# Patient Record
Sex: MALE | Race: WHITE | NOT HISPANIC OR LATINO | Employment: FULL TIME | URBAN - METROPOLITAN AREA
[De-identification: names, ages, dates, MRNs, and addresses within clinical notes are randomized per-mention and may not be internally consistent; named-entity substitution may affect disease eponyms.]

---

## 2018-01-03 ENCOUNTER — NON-PROVIDER VISIT (OUTPATIENT)
Dept: URGENT CARE | Facility: PHYSICIAN GROUP | Age: 38
End: 2018-01-03

## 2018-01-03 PROCEDURE — 8907 PR URINE COLLECT ONLY: Performed by: NURSE PRACTITIONER

## 2019-02-12 ENCOUNTER — OFFICE VISIT (OUTPATIENT)
Dept: URGENT CARE | Facility: CLINIC | Age: 39
End: 2019-02-12
Payer: MEDICAID

## 2019-02-12 VITALS
TEMPERATURE: 98.6 F | OXYGEN SATURATION: 98 % | RESPIRATION RATE: 16 BRPM | BODY MASS INDEX: 20.53 KG/M2 | SYSTOLIC BLOOD PRESSURE: 116 MMHG | DIASTOLIC BLOOD PRESSURE: 64 MMHG | WEIGHT: 160 LBS | HEART RATE: 65 BPM | HEIGHT: 74 IN

## 2019-02-12 DIAGNOSIS — M54.50 ACUTE RIGHT-SIDED LOW BACK PAIN WITHOUT SCIATICA: ICD-10-CM

## 2019-02-12 PROCEDURE — 99203 OFFICE O/P NEW LOW 30 MIN: CPT | Performed by: PHYSICIAN ASSISTANT

## 2019-02-12 RX ORDER — KETOROLAC TROMETHAMINE 30 MG/ML
60 INJECTION, SOLUTION INTRAMUSCULAR; INTRAVENOUS ONCE
Status: COMPLETED | OUTPATIENT
Start: 2019-02-12 | End: 2019-02-12

## 2019-02-12 RX ORDER — CYCLOBENZAPRINE HCL 10 MG
10 TABLET ORAL 3 TIMES DAILY PRN
Qty: 30 TAB | Refills: 0 | Status: SHIPPED | OUTPATIENT
Start: 2019-02-12

## 2019-02-12 RX ORDER — METHYLPREDNISOLONE 4 MG/1
TABLET ORAL
Qty: 1 KIT | Refills: 0 | Status: SHIPPED | OUTPATIENT
Start: 2019-02-12 | End: 2020-07-13

## 2019-02-12 RX ADMIN — KETOROLAC TROMETHAMINE 60 MG: 30 INJECTION, SOLUTION INTRAMUSCULAR; INTRAVENOUS at 13:25

## 2019-02-12 ASSESSMENT — ENCOUNTER SYMPTOMS
SHORTNESS OF BREATH: 0
WEIGHT LOSS: 0
HEADACHES: 0
NUMBNESS: 0
NAUSEA: 0
FEVER: 0
ABDOMINAL PAIN: 0
SENSORY CHANGE: 0
BLURRED VISION: 0
BOWEL INCONTINENCE: 0
PARESTHESIAS: 0
PALPITATIONS: 0
TINGLING: 0
BACK PAIN: 1
CHILLS: 0
VOMITING: 0

## 2019-02-12 NOTE — LETTER
February 12, 2019         Patient: Everton Junior   YOB: 1980   Date of Visit: 2/12/2019           To Whom it May Concern:    Everton Junior was seen in my clinic on 2/12/2019. He may return to work on 2/13/2019.    If you have any questions or concerns, please don't hesitate to call.        Sincerely,           Bibi Hurley P.A.-C.  Electronically Signed

## 2019-02-13 NOTE — PROGRESS NOTES
Subjective:      Everton Junior is a 38 y.o. male who presents with Back Pain (x3 months, comes and goes, throbbing, mostly on the right side)      Back Pain   This is a new problem. The current episode started today. The problem occurs constantly. The problem is unchanged. The pain is present in the lumbar spine. The quality of the pain is described as aching and cramping. The pain does not radiate. The pain is moderate. The pain is the same all the time. The symptoms are aggravated by bending, lying down and twisting. Pertinent negatives include no abdominal pain, bladder incontinence, bowel incontinence, chest pain, fever, headaches, numbness, paresthesias, pelvic pain, tingling or weight loss. He has tried NSAIDs for the symptoms. The treatment provided no relief.   Patient states that he first had an episode of back pain on his right side proximally 3 months ago.  He cannot recall any injury or change in activity that precipitated it.  He says that ever since that time it has been flaring up intermittently.  It has been fine for the past month but this morning when he woke up he said it was acutely painful again.  He says that the pain is there constantly but it has been consistently in his right lumbar spine and does not radiate into his lower extremities.    Review of Systems   Constitutional: Negative for chills, fever and weight loss.   Eyes: Negative for blurred vision.   Respiratory: Negative for shortness of breath.    Cardiovascular: Negative for chest pain and palpitations.   Gastrointestinal: Negative for abdominal pain, bowel incontinence, nausea and vomiting.   Genitourinary: Negative for bladder incontinence and pelvic pain.   Musculoskeletal: Positive for back pain.   Neurological: Negative for tingling, sensory change, numbness, headaches and paresthesias.       PMH:  has a past medical history of ASTHMA and Dental disorder.  MEDS:   Current Outpatient Prescriptions:   •  MethylPREDNISolone  "(MEDROL DOSEPAK) 4 MG Tablet Therapy Pack, Take as directed, Disp: 1 Kit, Rfl: 0  •  cyclobenzaprine (FLEXERIL) 10 MG Tab, Take 1 Tab by mouth 3 times a day as needed., Disp: 30 Tab, Rfl: 0  ALLERGIES: No Known Allergies  SURGHX:   Past Surgical History:   Procedure Laterality Date   • TENDON REPAIR  8/21/2009    Performed by ALBERTO CUBA at SURGERY SAME DAY [facility] ORS   • NERVE REPAIR  8/21/2009    Performed by ALBERTO CUBA at SURGERY SAME DAY [facility] ORS     SOCHX:  reports that he has been smoking.  He has been smoking about 1.00 pack per day. He does not have any smokeless tobacco history on file. He reports that he drinks alcohol. He reports that he does not use drugs.  FH: Family history was reviewed, no pertinent findings to report     Objective:     /64   Pulse 65   Temp 37 °C (98.6 °F)   Resp 16   Ht 1.88 m (6' 2\")   Wt 72.6 kg (160 lb)   SpO2 98%   BMI 20.54 kg/m²      Physical Exam   Constitutional: He is oriented to person, place, and time. He appears well-developed and well-nourished.   HENT:   Head: Normocephalic and atraumatic.   Right Ear: External ear normal.   Left Ear: External ear normal.   Eyes: Pupils are equal, round, and reactive to light. Conjunctivae are normal.   Neck: Normal range of motion.   Cardiovascular: Normal rate, regular rhythm and normal heart sounds.    No murmur heard.  Pulmonary/Chest: Effort normal and breath sounds normal. He has no wheezes.   Musculoskeletal:        Lumbar back: He exhibits decreased range of motion (Due to pain), tenderness (Paraspinous muscles tender to palpation) and spasm. He exhibits no bony tenderness and no swelling.        Back:    Thoracic and lumbar spinous processes are nontender to palpation with no step-offs identified.  Right lumbar paraspinous muscles are TTP.  No erythema, ecchymosis, swelling, or obvious deformity identified on exam.     Neurological: He is alert and oriented to person, place, and time.   Skin: Skin is " warm and dry. Capillary refill takes less than 2 seconds.   Psychiatric: He has a normal mood and affect. His behavior is normal. Judgment normal.       *Patient was observed for 10-15 minutes following the Toradol injection.  No adverse reactions were observed.    Assessment/Plan:     1. Acute right-sided low back pain without sciatica  - ketorolac (TORADOL) injection 60 mg; 2 mL by Intramuscular route Once.  - MethylPREDNISolone (MEDROL DOSEPAK) 4 MG Tablet Therapy Pack; Take as directed  Dispense: 1 Kit; Refill: 0  - cyclobenzaprine (FLEXERIL) 10 MG Tab; Take 1 Tab by mouth 3 times a day as needed.  Dispense: 30 Tab; Refill: 0  -Alternate ice and heat  -Do not take any other NSAIDs with the Medrol Dosepak.  I instructed him not to start the Medrol Dosepak until tomorrow        Differential Diagnosis, natural history, and supportive care discussed. Return to the Urgent Care or follow up with your PCP if symptoms fail to resolve, or for any new or worsening symptoms. Emergency room precautions discussed. Patient and/or family appears understanding of information.

## 2020-07-13 ENCOUNTER — OFFICE VISIT (OUTPATIENT)
Dept: URGENT CARE | Facility: CLINIC | Age: 40
End: 2020-07-13
Payer: MEDICAID

## 2020-07-13 VITALS
HEART RATE: 94 BPM | HEIGHT: 74 IN | WEIGHT: 165 LBS | SYSTOLIC BLOOD PRESSURE: 126 MMHG | OXYGEN SATURATION: 93 % | RESPIRATION RATE: 16 BRPM | BODY MASS INDEX: 21.17 KG/M2 | DIASTOLIC BLOOD PRESSURE: 74 MMHG | TEMPERATURE: 98.6 F

## 2020-07-13 DIAGNOSIS — M62.830 LUMBAR PARASPINAL MUSCLE SPASM: ICD-10-CM

## 2020-07-13 PROCEDURE — 99214 OFFICE O/P EST MOD 30 MIN: CPT | Mod: 25 | Performed by: PHYSICIAN ASSISTANT

## 2020-07-13 RX ORDER — CYCLOBENZAPRINE HCL 10 MG
10 TABLET ORAL 3 TIMES DAILY PRN
Qty: 14 TAB | Refills: 0 | Status: SHIPPED | OUTPATIENT
Start: 2020-07-13

## 2020-07-13 RX ORDER — METHYLPREDNISOLONE 4 MG/1
TABLET ORAL
Qty: 21 TAB | Refills: 0 | Status: SHIPPED | OUTPATIENT
Start: 2020-07-13

## 2020-07-13 RX ORDER — KETOROLAC TROMETHAMINE 30 MG/ML
60 INJECTION, SOLUTION INTRAMUSCULAR; INTRAVENOUS ONCE
Status: COMPLETED | OUTPATIENT
Start: 2020-07-13 | End: 2020-07-13

## 2020-07-13 RX ADMIN — KETOROLAC TROMETHAMINE 60 MG: 30 INJECTION, SOLUTION INTRAMUSCULAR; INTRAVENOUS at 10:47

## 2020-07-13 ASSESSMENT — ENCOUNTER SYMPTOMS
WEAKNESS: 0
NAUSEA: 0
CHILLS: 0
SENSORY CHANGE: 0
NECK PAIN: 0
FOCAL WEAKNESS: 0
FALLS: 0
SHORTNESS OF BREATH: 0
HEADACHES: 0
BACK PAIN: 1
DIZZINESS: 0
DIARRHEA: 0
WHEEZING: 0
FEVER: 0
ABDOMINAL PAIN: 0
VOMITING: 0

## 2020-07-13 NOTE — PROGRESS NOTES
Subjective:   Everton Junior is a 39 y.o. male who presents for Low Back Pain (pt states he has this in the past and this has been going on x 3 days)      HPI  Patient is a 39-year-old male who presents with low back pain onset 3 days ago.  History of low back muscle spasms in the past.  Treated with Flexeril and Medrol Dosepak with resolution of symptoms.     Onset of symptoms when he got out of bed.  Have slept wrong.  Gradually worsened.  Worse with cough.   Low right back.   Worse with standing upright and laying down.    Limited bending and extension of back due to pain.  Worse with movements.  No injury or bending down a lot.   Moderate pain.   One 800 mg Ibuprofen last night with no relief.     Smokers cough but no worsening   Denies any fever, chills, numbness, tingling, or weakness to legs, chest pain, SOB, abdominal pain, nausea, vomiting, diarrhea, loss of bowel or bladder control.     No pertinent past medical history.       Review of Systems   Constitutional: Negative for chills, fever and malaise/fatigue.   Respiratory: Negative for shortness of breath and wheezing.    Cardiovascular: Negative for chest pain.   Gastrointestinal: Negative for abdominal pain, diarrhea, nausea and vomiting.   Musculoskeletal: Positive for back pain. Negative for falls and neck pain.   Neurological: Negative for dizziness, sensory change, focal weakness, weakness and headaches.       Medications:    • cyclobenzaprine Tabs  • methylPREDNISolone Tbpk    Allergies: Patient has no known allergies.    Problem List: Everton Junior does not have a problem list on file.    Surgical History:  Past Surgical History:   Procedure Laterality Date   • TENDON REPAIR  8/21/2009    Performed by ALBERTO CUBA at SURGERY SAME DAY [facility] ORS   • NERVE REPAIR  8/21/2009    Performed by ALBERTO CUBA at SURGERY SAME DAY [facility] ORS       Past Social Hx: Everton Junior  reports that he has been smoking. He has been smoking  "about 1.00 pack per day. He has never used smokeless tobacco. He reports previous alcohol use. He reports that he does not use drugs.     Past Family Hx:  Everton Junior family history is not on file.     Problem list, medications, and allergies reviewed by myself today in Epic.     Objective:     /74 (BP Location: Left arm, Patient Position: Sitting, BP Cuff Size: Adult)   Pulse 94   Temp 37 °C (98.6 °F) (Temporal)   Resp 16   Ht 1.88 m (6' 2\")   Wt 74.8 kg (165 lb)   SpO2 93%   BMI 21.18 kg/m²     Physical Exam  Vitals signs reviewed.   Constitutional:       General: He is not in acute distress.     Appearance: Normal appearance. He is not ill-appearing or toxic-appearing.   Eyes:      Conjunctiva/sclera: Conjunctivae normal.      Pupils: Pupils are equal, round, and reactive to light.   Neck:      Musculoskeletal: Normal range of motion and neck supple. No neck rigidity, crepitus, pain with movement or muscular tenderness.   Cardiovascular:      Rate and Rhythm: Normal rate and regular rhythm.      Heart sounds: Normal heart sounds.   Pulmonary:      Effort: Pulmonary effort is normal. No respiratory distress.      Breath sounds: Normal breath sounds. No wheezing, rhonchi or rales.   Musculoskeletal:      Comments: Back: Flexion to about 60 degrees due to pain.  Extension to about 10 degrees due to pain.  Cannot elicit any tenderness to palpation.  Spasm of right lumbar paraspinal muscles.  Negative midline tenderness, bony tenderness, crepitus, deformities, or step-offs.  No ecchymosis or swelling.  Negative straight leg raise    Lower extremities: Full range of motion.  Strength 5 out of 5 to hip flexion and extension  Strength 5 out of 5 to knee flexion extension  Sensation intact  Patellar and Achilles tendon DTR intact         Lymphadenopathy:      Cervical: No cervical adenopathy.   Skin:     General: Skin is warm and dry.   Neurological:      General: No focal deficit present.      Mental " Status: He is alert and oriented to person, place, and time.   Psychiatric:         Mood and Affect: Mood normal.         Behavior: Behavior normal.         Assessment/Plan:     Diagnosis and associated orders:     1. Lumbar paraspinal muscle spasm  ketorolac (TORADOL) injection 60 mg    methylPREDNISolone (MEDROL DOSEPAK) 4 MG Tablet Therapy Pack    cyclobenzaprine (FLEXERIL) 10 MG Tab      Comments/MDM:     • As tolerated, range of motion exercises, stretches, heat, massage.  Avoid laying down or sitting down for long periods of time except for at night when sleeping.   Treatment of cyclobenzaprine.   • Discussed with patient this medication can cause drowsiness/sedation. The patient was instructed to use medication mostly during the evening/night time. Instructed to avoid driving, operating machinery, or drinking alcohol while using this medication. Patient verbalized understanding and agreement.   • Instructed not to start Medrol Dosepak until tomorrow morning.  Instructed to avoid NSAIDs until after Medrol Dosepak.   • Discharge instructions handed to patient.  • Return to the urgent care if symptoms are not improving within the next week or not significantly improving in the next 2 weeks.   • Overall, the patient is well-appearing, suspicions for spinal stenosis or radiculopathy are low.   • He is going to follow-up with a PCP at [facility]       Red flags discussed and indications to immediately call 911 or present to the Emergency Department.   Supportive care, differential diagnoses, and indications for immediate follow-up discussed with patient.    Pathogenesis of diagnosis discussed including typical length and natural progression. Patient expresses understanding and agrees to plan.    Advised the patient to follow-up with the primary care physician for recheck, reevaluation, and consideration of further management.    Please note that this dictation was created using voice recognition software. I have  made a reasonable attempt to correct obvious errors, but I expect that there are errors of grammar and possibly content that I did not discover before finalizing the note.    This note was electronically signed by Poncho Dietrich PA-C

## 2023-04-07 ENCOUNTER — HOSPITAL ENCOUNTER (EMERGENCY)
Facility: MEDICAL CENTER | Age: 43
End: 2023-04-07
Attending: EMERGENCY MEDICINE
Payer: MEDICAID

## 2023-04-07 VITALS
HEIGHT: 74 IN | WEIGHT: 190.26 LBS | SYSTOLIC BLOOD PRESSURE: 135 MMHG | DIASTOLIC BLOOD PRESSURE: 75 MMHG | OXYGEN SATURATION: 99 % | RESPIRATION RATE: 18 BRPM | HEART RATE: 72 BPM | BODY MASS INDEX: 24.42 KG/M2 | TEMPERATURE: 98.2 F

## 2023-04-07 DIAGNOSIS — L73.9 FOLLICULITIS: ICD-10-CM

## 2023-04-07 PROCEDURE — 99284 EMERGENCY DEPT VISIT MOD MDM: CPT | Mod: 25

## 2023-04-07 PROCEDURE — 303977 HCHG I & D

## 2023-04-07 ASSESSMENT — PAIN DESCRIPTION - PAIN TYPE: TYPE: ACUTE PAIN

## 2023-04-08 NOTE — ED NOTES
Vital signs taken and recorded. Discharge in stable condition via ambulatory. Health teachings given to patient and family with full understanding of the information given. No personal belongings left.

## 2023-04-08 NOTE — ED PROVIDER NOTES
"                                                        ED Provider Note    CHIEF COMPLAINT  Chief Complaint   Patient presents with    Headache    Wound Check     Pt reports headache and pressure on his right side of the head due to a small wound (red and round), started noticing growing 4 days ago and sometimes affects his balance when walking. He had the same condition years ago. Denies fever, vomiting or eye sight problem.        HPI    Primary care provider: ANDRES Bray   History obtained from: Patient  History limited by: None     Everton Junior is a 42 y.o. male who presents to the ED complaining of enlarging painful lump on the right side of his scalp that started 3 to 4 days ago and progressively worsening.  Patient states that he was seen at [facility] yesterday for the same and was told to use warm compresses.  Patient states that he did some online research and wants it drained because \"I read that it can cause a bald spot.\"  Patient states that he had similar symptoms a few years ago when he scratched his head but the swelling went away.  Patient reports that the current swelling is causing a headache.  No fever/nausea/vomiting.    REVIEW OF SYSTEMS  Please see HPI for pertinent positives/negatives.  All other systems reviewed and are negative.     PAST MEDICAL HISTORY  Past Medical History:   Diagnosis Date    ASTHMA     Dental disorder         SURGICAL HISTORY  Past Surgical History:   Procedure Laterality Date    TENDON REPAIR  8/21/2009    Performed by ALBERTO CUBA at SURGERY SAME DAY [facility]    NERVE REPAIR  8/21/2009    Performed by ALBERTO CUBA at SURGERY SAME DAY [facility]        SOCIAL HISTORY  Social History     Tobacco Use    Smoking status: Every Day     Packs/day: 1.00     Types: Cigarettes    Smokeless tobacco: Never   Vaping Use    Vaping Use: Never used   Substance and Sexual Activity    Alcohol use: Not Currently     Comment: stopped about " "a year ago    Drug use: No    Sexual activity: Not on file        FAMILY HISTORY  No family history on file.     CURRENT MEDICATIONS  Home Medications       Reviewed by Sarita Merlos R.N. (Registered Nurse) on 04/07/23 at 2017  Med List Status: Partial     Medication Last Dose Status   cyclobenzaprine (FLEXERIL) 10 MG Tab  Active   cyclobenzaprine (FLEXERIL) 10 MG Tab  Active   cyclobenzaprine (FLEXERIL) 5 mg tablet  Active   methylPREDNISolone (MEDROL DOSEPAK) 4 MG Tablet Therapy Pack  Active   methylPREDNISolone (MEDROL DOSEPAK) 4 MG Tablet Therapy Pack  Active                     ALLERGIES  No Known Allergies     PHYSICAL EXAM  VITAL SIGNS: /75   Pulse 72   Temp 36.8 °C (98.2 °F) (Temporal)   Resp 18   Ht 1.88 m (6' 2\")   Wt 86.3 kg (190 lb 4.1 oz)   SpO2 99%   BMI 24.43 kg/m²  @MACIE[[id number]::@     Pulse ox interpretation: 96% I interpret this pulse ox as normal     Constitutional: Well developed, well nourished, alert in no apparent distress, nontoxic appearance    HENT: No external signs of trauma, approximately 1.5 cm roughly circular erythematous swelling on right parietal scalp with tenderness to palpation without crepitus/fluctuance/streaking/drainage, normocephalic  Eyes: PERRL, conjunctiva without erythema, no discharge, no icterus    Neck: No stridor, good ROM without apparent restrictions or discomfort  Thorax & Lungs: No respiratory distress  Extremities: No cyanosis, no edema, no gross deformity  Skin: Warm, dry, no pallor/cyanosis, no rash noted      Lymphatic: No lymphadenopathy noted     Neuro: A/O times 3, no focal deficits noted, ambulating without difficulty  Psychiatric: Cooperative      DIAGNOSTIC STUDIES / PROCEDURES    Verbal consent was obtained for incision and drainage.  The area of the incision site was sterilely prepped/draped and locally infiltrated with 1 mL of 2% lidocaine with epi.  An incision was then made with a #11 blade over the apex of the lesion.  " Small amount of purulent material was expressed.  The patient tolerated the procedure without complications.        LABS  All labs reviewed by me.     Results for orders placed or performed during the hospital encounter of 12/04/14   COMP METABOLIC PANEL   Result Value Ref Range    Sodium 140 135 - 145 mmol/L    Potassium 3.0 (L) 3.6 - 5.5 mmol/L    Chloride 106 96 - 112 mmol/L    Co2 24 20 - 33 mmol/L    Anion Gap 10.0 0.0 - 11.9    Glucose 78 65 - 99 mg/dL    Bun 10 8 - 22 mg/dL    Creatinine 1.08 0.50 - 1.40 mg/dL    Calcium 9.9 8.5 - 10.5 mg/dL    AST(SGOT) 23 12 - 45 U/L    ALT(SGPT) 15 2 - 50 U/L    Alkaline Phosphatase 57 30 - 99 U/L    Total Bilirubin 1.3 0.1 - 1.5 mg/dL    Albumin 4.8 3.2 - 4.9 g/dL    Total Protein 7.4 6.0 - 8.2 g/dL    Globulin 2.6 1.9 - 3.5 g/dL    A-G Ratio 1.8 g/dL   CBC WITH DIFFERENTIAL   Result Value Ref Range    WBC 12.6 (H) 4.8 - 10.8 K/uL    RBC 5.19 4.70 - 6.10 M/uL    Hemoglobin 16.5 14.0 - 18.0 g/dL    Hematocrit 46.9 42.0 - 52.0 %    MCV 90.4 81.4 - 97.8 fL    MCH 31.8 27.0 - 33.0 pg    MCHC 35.2 33.7 - 35.3 g/dL    RDW 41.0 35.9 - 50.0 fL    Platelet Count 244 164 - 446 K/uL    MPV 9.9 9.0 - 12.9 fL    Nucleated RBC 0.0 /100 WBC    NRBC (Absolute) 0.00 K/uL    Neutrophils-Polys 43.1 (L) 44.0 - 72.0 %    Lymphocytes 46.5 (H) 22.0 - 41.0 %    Monocytes 6.9 0.0 - 13.4 %    Eosinophils 1.7 0.0 - 6.9 %    Basophils 0.9 0.0 - 1.8 %    Neutrophils (Absolute) 5.54 1.82 - 7.42 K/uL    Lymphs (Absolute) 5.9 (H) 1.0 - 4.8 K/uL    Monos (Absolute) 1.00 (H) 0.00 - 0.85 K/uL    Eos (Absolute) 0.21 0.00 - 0.51 K/uL    Baso (Absolute) 0.00 0.00 - 0.12 K/uL    Anisocytosis 1+     Macrocytosis 1+    DIAGNOSTIC ALCOHOL   Result Value Ref Range    Diagnostic Alcohol 0.22 (H) 0.00 g/dL   URINE DRUG SCREEN   Result Value Ref Range    Amphetamines Urine Negative Negative    Barbiturates Negative Negative    Benzodiazepines Negative Negative    Cocaine Metabolite Negative Negative    Methadone  Negative Negative    Ecstasy Negative Negative    Opiates Negative Negative    Oxycodone Negative Negative    Phencyclidine -Pcp Negative Negative    Propoxyphene Negative Negative    Cannabinoid Metab Negative Negative   EXTRA TUBE,SÁNCHEZ   Result Value Ref Range    Extra Tube, Blue Collected    ESTIMATED GFR   Result Value Ref Range    GFR If African American >60 >60 mL/min/1.73 m 2    GFR If Non African American >60 >60 mL/min/1.73 m 2   DIFFERENTIAL MANUAL   Result Value Ref Range    Bands-Stabs 0.9 0.0 - 10.0 %   PERIPHERAL SMEAR REVIEW   Result Value Ref Range    Peripheral Smear Review see below    PLATELET ESTIMATE   Result Value Ref Range    Plt Estimation Normal    MORPHOLOGY   Result Value Ref Range    RBC Morphology Present     Reactive Lymphocytes Few         RADIOLOGY  I have independently interpreted the diagnostic imaging associated with this visit and am waiting the final reading from the radiologist.     No orders to display          COURSE & MEDICAL DECISION MAKING  Nursing notes, VS, PMSFHx reviewed in chart.     Review of past medical records shows the patient was seen at [facility] yesterday regarding his symptoms and was diagnosed with hair follicle disorder.  He was seen in the office on April 5, 2023 and diagnosed with support keratosis.      Differential diagnoses considered include but are not limited to: Folliculitis, sebaceous cyst, abscess, cellulitis      ED Observation Status? No; Patient does not meet criteria for ED Observation.       INITIAL ASSESSMENT AND PLAN  Care Narrative: This is a 42-year-old male patient with history of asthma who presents complaining of increasing pain and swelling on the right side of his scalp and is requesting I&D.  Patient otherwise well-appearing and nontoxic in appearance.  We will perform I&D.      Discussion of management with other QHP or appropriate source(s): None     Escalation of care considered, and ultimately not performed:  blood analysis, diagnostic imaging, and acute inpatient care management, however at this time, the patient is most appropriate for outpatient management.     Decision tools and prescription drugs considered including, but not limited to: Antibiotics   and Pain Medications   .        History and physical exam as above.  Patient presents to the ED with what appears to be a small folliculitis or sebaceous cyst on his right scalp.  He is requesting I&D.  He states that he was prescribed Keflex and Bactrim by [facility] but has yet to  from the pharmacy.  I&D was performed as above without complications.  Patient clinically stable during his ED stay.  No focal neurological findings or other concerning features to suggest emergent pathology such as CVA, sepsis or meningitis.  I discussed with patient good wound care, outpatient follow-up and return to ED precautions.  He was advised to take his Keflex and Bactrim as prescribed.  Patient verbalized understanding and agreed with plan of care with no further questions or concerns.      The patient is referred to a primary physician for blood pressure management, diabetic screening, and for all other preventative health concerns.       FINAL IMPRESSION  1. Folliculitis Acute          DISPOSITION  Patient will be discharged home in stable condition.       FOLLOW UP  LULY Osuna  [address]  [facility] [address]  [facility] [phone]    Call in 3 days      [facility], Emergency Dept  [address]  [facility] [id number] [phone]    If symptoms worsen         OUTPATIENT MEDICATIONS  Discharge Medication List as of 4/7/2023 11:38 PM             Electronically signed by: Dereck Golden D.O., 4/7/2023 8:16 PM      Portions of this record were made with voice recognition software.  Despite my review, spelling/grammar/context errors may still remain.  Interpretation of this chart should be taken in this  context.

## 2023-04-08 NOTE — ED TRIAGE NOTES
"Chief Complaint   Patient presents with    Headache    Wound Check     Pt reports headache and pressure on his right side of the head due to a small wound (red and round), started noticing growing 4 days ago and sometimes affects his balance when walking. He had the same condition years ago. Denies fever, vomiting or eye sight problem.     BP (!) 150/94   Pulse 77   Temp 36.8 °C (98.3 °F) (Temporal)   Resp 16   Ht 1.88 m (6' 2\")   Wt 86.3 kg (190 lb 4.1 oz)   SpO2 96%   BMI 24.43 kg/m²    Patient alert, oriented and ambulatory. Steady on his face  "

## 2024-05-02 ENCOUNTER — DOCUMENTATION (OUTPATIENT)
Dept: HEALTH INFORMATION MANAGEMENT | Facility: OTHER | Age: 44
End: 2024-05-02
Payer: MEDICAID

## 2024-07-24 ENCOUNTER — DOCUMENTATION (OUTPATIENT)
Dept: HEALTH INFORMATION MANAGEMENT | Facility: OTHER | Age: 44
End: 2024-07-24
Payer: MEDICAID